# Patient Record
Sex: FEMALE | Race: WHITE
[De-identification: names, ages, dates, MRNs, and addresses within clinical notes are randomized per-mention and may not be internally consistent; named-entity substitution may affect disease eponyms.]

---

## 2022-12-18 ENCOUNTER — HOSPITAL ENCOUNTER (EMERGENCY)
Dept: HOSPITAL 75 - ER | Age: 72
Discharge: HOME | End: 2022-12-18
Payer: MEDICARE

## 2022-12-18 VITALS — DIASTOLIC BLOOD PRESSURE: 64 MMHG | SYSTOLIC BLOOD PRESSURE: 129 MMHG

## 2022-12-18 VITALS — WEIGHT: 165.35 LBS | HEIGHT: 65.75 IN | BODY MASS INDEX: 26.89 KG/M2

## 2022-12-18 DIAGNOSIS — N39.0: ICD-10-CM

## 2022-12-18 DIAGNOSIS — Z20.822: ICD-10-CM

## 2022-12-18 DIAGNOSIS — R73.9: ICD-10-CM

## 2022-12-18 DIAGNOSIS — J10.89: Primary | ICD-10-CM

## 2022-12-18 LAB
ALBUMIN SERPL-MCNC: 4.3 GM/DL (ref 3.2–4.5)
ALP SERPL-CCNC: 76 U/L (ref 40–136)
ALT SERPL-CCNC: 24 U/L (ref 0–55)
APTT PPP: YELLOW S
BACTERIA #/AREA URNS HPF: (no result) /HPF
BASOPHILS # BLD AUTO: 0 10^3/UL (ref 0–0.1)
BASOPHILS NFR BLD AUTO: 0 % (ref 0–10)
BILIRUB SERPL-MCNC: 0.5 MG/DL (ref 0.1–1)
BILIRUB UR QL STRIP: NEGATIVE
BUN/CREAT SERPL: 14
CALCIUM SERPL-MCNC: 9 MG/DL (ref 8.5–10.1)
CHLORIDE SERPL-SCNC: 102 MMOL/L (ref 98–107)
CO2 SERPL-SCNC: 22 MMOL/L (ref 21–32)
CREAT SERPL-MCNC: 0.95 MG/DL (ref 0.6–1.3)
EOSINOPHIL # BLD AUTO: 0 10^3/UL (ref 0–0.3)
EOSINOPHIL NFR BLD AUTO: 0 % (ref 0–10)
FIBRINOGEN PPP-MCNC: CLEAR MG/DL
GFR SERPLBLD BASED ON 1.73 SQ M-ARVRAT: 64 ML/MIN
GLUCOSE SERPL-MCNC: 186 MG/DL (ref 70–105)
GLUCOSE UR STRIP-MCNC: NEGATIVE MG/DL
HCT VFR BLD CALC: 46 % (ref 35–52)
HGB BLD-MCNC: 15.5 G/DL (ref 11.5–16)
HYALINE CASTS #/AREA URNS LPF: (no result) /LPF
KETONES UR QL STRIP: (no result)
LEUKOCYTE ESTERASE UR QL STRIP: NEGATIVE
LYMPHOCYTES # BLD AUTO: 0.8 10^3/UL (ref 1–4)
LYMPHOCYTES NFR BLD AUTO: 7 % (ref 12–44)
MANUAL DIFFERENTIAL PERFORMED BLD QL: NO
MCH RBC QN AUTO: 30 PG (ref 25–34)
MCHC RBC AUTO-ENTMCNC: 34 G/DL (ref 32–36)
MCV RBC AUTO: 87 FL (ref 80–99)
MONOCYTES # BLD AUTO: 0.7 10^3/UL (ref 0–1)
MONOCYTES NFR BLD AUTO: 7 % (ref 0–12)
NEUTROPHILS # BLD AUTO: 9 10^3/UL (ref 1.8–7.8)
NEUTROPHILS NFR BLD AUTO: 85 % (ref 42–75)
NITRITE UR QL STRIP: NEGATIVE
PH UR STRIP: 5.5 [PH] (ref 5–9)
PLATELET # BLD: 221 10^3/UL (ref 130–400)
PMV BLD AUTO: 10 FL (ref 9–12.2)
POTASSIUM SERPL-SCNC: 3.3 MMOL/L (ref 3.6–5)
PROT SERPL-MCNC: 7.5 GM/DL (ref 6.4–8.2)
PROT UR QL STRIP: (no result)
RBC #/AREA URNS HPF: (no result) /HPF
SODIUM SERPL-SCNC: 136 MMOL/L (ref 135–145)
SP GR UR STRIP: >=1.03 (ref 1.02–1.02)
SQUAMOUS #/AREA URNS HPF: (no result) /HPF
WBC # BLD AUTO: 10.6 10^3/UL (ref 4.3–11)
WBC #/AREA URNS HPF: (no result) /HPF

## 2022-12-18 PROCEDURE — 87636 SARSCOV2 & INF A&B AMP PRB: CPT

## 2022-12-18 PROCEDURE — 81000 URINALYSIS NONAUTO W/SCOPE: CPT

## 2022-12-18 PROCEDURE — 80053 COMPREHEN METABOLIC PANEL: CPT

## 2022-12-18 PROCEDURE — 85025 COMPLETE CBC W/AUTO DIFF WBC: CPT

## 2022-12-18 PROCEDURE — 87088 URINE BACTERIA CULTURE: CPT

## 2022-12-18 PROCEDURE — 93041 RHYTHM ECG TRACING: CPT

## 2022-12-18 PROCEDURE — 36415 COLL VENOUS BLD VENIPUNCTURE: CPT

## 2022-12-18 PROCEDURE — 71045 X-RAY EXAM CHEST 1 VIEW: CPT

## 2022-12-18 NOTE — ED COUGH/URI
General


Chief Complaint:  Cough/Cold/Flu Symptoms


Stated Complaint:  INFLUENZA A+/BODYACHES/WEAKNESS/CONFUSED/SOA


Nursing Triage Note:  


Patient was seen at [facility] today with complaints of cough, congestion and 


fever. Pt. was diagnosed with Flu A. Temp is currently 104.5


Source:  patient (LIMITED HISTORIAN), family (SON-LIMITED HISTORIAN)





History of Present Illness


Date Seen by Provider:  Dec 18, 2022


Time Seen by Provider:  20:00


Initial Comments


PT ARRIVES VIA POV FROM HOME, WITH SON


PT BEGAN GETTING SICK YESTERDAY WITH A SCRATCHY THROAT AND COUGH/CONGESTION


SYMPTOMS WORSE TODAY


SHE DROVE HERSELF TO [facility] --DOES NOT KNOW WHICH ONE SHE WENT TO--SHE THINKS 

"[place]" ( THERE IS NOT A [facility] IN [place])


SHE STATES SHE TESTED + FOR THE FLU, DOES NOT KNOW IF SHE WAS PRESCRIBED ANY 

MEDICATIONS OR NOT, BUT SHE HAS NOT TAKEN ANYTHING FOR ANY OF HER SYMPTOMS AT 

ANY TIME


SHE HAS NOT CHECKED HER TEMP AT HOME AND WAS UNAWARE OF FEVER--TEMP .5 ON 

ARRIVAL HERE


PT HAS HAD BODY ACHES, HEADACHE, GENERALIZED WEAKNESS, FEELING SHORT OF BREATH, 

AND FAMILY STATES SHE HAS BEEN DISORIENTED TONIGHT





PT DENIES ANY GI SYMPTOMS, AND STATES SHE HAS BEEN DRINKING LIQUIDS AND VOIDING 

NORMALLY





SHE MOVED HERE A FEW MONTHS AGO FROM [place], [place]


SHE HAS BEEN TO [facility] TO ESTABLISH CARE, BUT DOES NOT KNOW WHICH ONE IT IS. 





SHE HAS HISTORY OF HTN


SHE HAD BREAST CANCER APPROXIMATELY 15 YEARS AGO. BILAT MASTECTOMY ONLY. NO 

CHEMO, NO RADIATION


SHE IS NON-SMOKER, NON-DRINKER


DENIES ANY HISTORY OF RESPIRATORY PROBLEMS OR CARDIAC PROBLEMS OR DIABETES





PCP: [facility]





Allergies and Home Medications


Allergies


Coded Allergies:  


     No Known Drug Allergies (Unverified , 12/18/22)





Review of Systems


Review of Systems


Constitutional:  see HPI, fever, malaise, weakness


EENTM:  see HPI, nose congestion, throat pain


Respiratory:  see HPI, cough, short of breath


Cardiovascular:  no symptoms reported; No chest pain


Gastrointestinal:  No abdominal pain, No diarrhea, No nausea, No vomiting


Genitourinary:  no symptoms reported; No decreased output


Musculoskeletal:  see HPI (BODY ACHES)


Skin:  no symptoms reported


Psychiatric/Neurological:  See HPI, Headache


Hematologic/Lymphatic:  No Symptoms Reported


Immunological/Allergic:  no symptoms reported





Past Medical-Social-Family Hx


Patient Social History


Tobacco Use?:  No


Smoking Status:  Never a Smoker


Smokeless Tobacco Frequency:  Never a User


Use of E-Cig and/or Vaping Nigel:  Never a User


Substance use?:  No


Alcohol Use?:  No





Past Medical History


Surgeries:  Yes (BILATERAL MASTECTOMY)


Breast


Respiratory:  No


Cardiac:  Yes


Hypertension


Neurological:  No


GYN History:  Menopausal


Genitourinary:  No


Gastrointestinal:  No


Musculoskeletal:  No


Endocrine:  No


HEENT:  No


Cancer:  Yes


Breast


Did You Recieve Any Treatments:  Yes


What Type of Treatment Did You:  Surgical Intervention





BILATERAL MASTECTOMY APPROXIMATELY 15 YEARS AGO


NO CHEMO OR RADIATION


Psychosocial:  No


Integumentary:  No


Blood Disorders:  No





Physical Exam





Vital Signs - First Documented








 12/18/22





 19:59


 


Temp 40.3


 


Pulse 134


 


Resp 20


 


B/P (MAP) 158/91 (113)


 


Pulse Ox 95


 


O2 Delivery Room Air





Capillary Refill : Less Than 3 Seconds


Height: '"


Weight: lbs. oz. kg; 26.00 BMI


Method:


General Appearance:  WD/WN, no apparent distress, other (LETHARGIC, TALKS BUT 

GIVES MINIMAL ANSWERS. )


HEENT:  PERRL/EOMI, other (ORAL MUCOSA DRY)


Neck:  normal inspection


Respiratory:  normal breath sounds, no respiratory distress, no accessory muscle

use


Cardiovascular:  no edema, no JVD, no murmur, tachycardia (130'S)


Gastrointestinal:  non tender, soft


Extremities:  normal inspection, no pedal edema, normal capillary refill


Neurologic/Psychiatric:  CNs II-XII nml as tested, no motor/sensory deficits, 

alert, normal mood/affect, oriented x 3 (BUT LIMITED MEMORY AT THIS TIME, ), 

other (GENERALIZED WEAKNESS, NO FOCAL DEFICITS)


Skin:  normal color, warm/dry, other (VERY WARM)





Progress/Results/Core Measures


Suspected Sepsis


SIRS


Temperature: 


Pulse: 134 


Respiratory Rate: 20


 


Laboratory Tests


12/18/22 20:10: White Blood Count 10.6


Blood Pressure 158 /91 


Mean: 113


 


Laboratory Tests


12/18/22 20:10: 


Creatinine 0.95, Platelet Count 221, Total Bilirubin 0.5








Results/Orders


Lab Results





Laboratory Tests








Test


 12/18/22


20:10 12/18/22


20:21 12/18/22


21:10 Range/Units


 


 


White Blood Count


 10.6 


 


 


 4.3-11.0


10^3/uL


 


Red Blood Count


 5.26 H


 


 


 3.80-5.11


10^6/uL


 


Hemoglobin 15.5    11.5-16.0  g/dL


 


Hematocrit 46    35-52  %


 


Mean Corpuscular Volume 87    80-99  fL


 


Mean Corpuscular Hemoglobin 30    25-34  pg


 


Mean Corpuscular Hemoglobin


Concent 34 


 


 


 32-36  g/dL





 


Red Cell Distribution Width 13.1    10.0-14.5  %


 


Platelet Count


 221 


 


 


 130-400


10^3/uL


 


Mean Platelet Volume 10.0    9.0-12.2  fL


 


Immature Granulocyte % (Auto) 0     %


 


Neutrophils (%) (Auto) 85 H   42-75  %


 


Lymphocytes (%) (Auto) 7 L   12-44  %


 


Monocytes (%) (Auto) 7    0-12  %


 


Eosinophils (%) (Auto) 0    0-10  %


 


Basophils (%) (Auto) 0    0-10  %


 


Neutrophils # (Auto)


 9.0 H


 


 


 1.8-7.8


10^3/uL


 


Lymphocytes # (Auto)


 0.8 L


 


 


 1.0-4.0


10^3/uL


 


Monocytes # (Auto)


 0.7 


 


 


 0.0-1.0


10^3/uL


 


Eosinophils # (Auto)


 0.0 


 


 


 0.0-0.3


10^3/uL


 


Basophils # (Auto)


 0.0 


 


 


 0.0-0.1


10^3/uL


 


Immature Granulocyte # (Auto)


 0.0 


 


 


 0.0-0.1


10^3/uL


 


Sodium Level 136    135-145  MMOL/L


 


Potassium Level 3.3 L   3.6-5.0  MMOL/L


 


Chloride Level 102      MMOL/L


 


Carbon Dioxide Level 22    21-32  MMOL/L


 


Anion Gap 12    5-14  MMOL/L


 


Blood Urea Nitrogen 13    7-18  MG/DL


 


Creatinine


 0.95 


 


 


 0.60-1.30


MG/DL


 


Estimat Glomerular Filtration


Rate 64 


 


 


  





 


BUN/Creatinine Ratio 14     


 


Glucose Level 186 H     MG/DL


 


Calcium Level 9.0    8.5-10.1  MG/DL


 


Corrected Calcium 8.8    8.5-10.1  MG/DL


 


Total Bilirubin 0.5    0.1-1.0  MG/DL


 


Aspartate Amino Transf


(AST/SGOT) 21 


 


 


 5-34  U/L





 


Alanine Aminotransferase


(ALT/SGPT) 24 


 


 


 0-55  U/L





 


Alkaline Phosphatase 76      U/L


 


Total Protein 7.5    6.4-8.2  GM/DL


 


Albumin 4.3    3.2-4.5  GM/DL


 


Influenza Type A (RT-PCR)  Detected H  Not Detecte  


 


Influenza Type B (RT-PCR)  Not Detected   Not Detecte  


 


SARS-CoV-2 RNA (RT-PCR)  Not Detected   Not Detecte  


 


Urine Color   YELLOW   


 


Urine Clarity   CLEAR   


 


Urine pH   5.5  5-9  


 


Urine Specific Gravity   >=1.030  1.016-1.022  


 


Urine Protein   1+ H NEGATIVE  


 


Urine Glucose (UA)   NEGATIVE  NEGATIVE  


 


Urine Ketones   2+ H NEGATIVE  


 


Urine Nitrite   NEGATIVE  NEGATIVE  


 


Urine Bilirubin   NEGATIVE  NEGATIVE  


 


Urine Urobilinogen   0.2  < = 1.0  MG/DL


 


Urine Leukocyte Esterase   NEGATIVE  NEGATIVE  


 


Urine RBC (Auto)   2+ H NEGATIVE  


 


Urine RBC   2-5 H  /HPF


 


Urine WBC   5-10 H  /HPF


 


Urine Squamous Epithelial


Cells 


 


 0-2 


  /HPF





 


Urine Crystals   NONE   /LPF


 


Urine Bacteria   FEW H  /HPF


 


Urine Casts   PRESENT   /LPF


 


Urine Hyaline Casts   5-10 H  /LPF


 


Urine Mucus   SMALL H  /LPF


 


Urine Culture Indicated   YES   








My Orders





Orders - JESI CORDOVA DO


Ed Iv/Invasive Line Start (12/18/22 20:03)


Monitor-Rhythm Ecg Trace Only (12/18/22 20:03)


Cbc With Automated Diff (12/18/22 20:03)


Comprehensive Metabolic Panel (12/18/22 20:03)


Ua Culture If Indicated (12/18/22 20:03)


Ed Iv/Invasive Line Start (12/18/22 20:03)


Lactated Ringers (Lr 1000 Ml Iv Solution (12/18/22 20:15)


Acetaminophen  Tablet (Tylenol  Tablet) (12/18/22 20:15)


Ibuprofen  Tablet (Motrin  Tablet) (12/18/22 20:15)


Chest 1 View, Ap/Pa Only (12/18/22 20:03)


Covid 19 Inhouse Test (12/18/22 20:08)


Influenza A And B By Pcr (12/18/22 20:08)


Isolation Central Supply Req (12/18/22 20:08)


Urine Culture (12/18/22 21:10)





Medications Given in ED





Current Medications








 Medications  Dose


 Ordered  Sig/Saul


 Route  Start Time


 Stop Time Status Last Admin


Dose Admin


 


 Acetaminophen  1,000 mg  ONCE  ONCE


 PO  12/18/22 20:15


 12/18/22 20:16 DC 12/18/22 20:11


1,000 MG


 


 Ibuprofen  800 mg  ONCE  ONCE


 PO  12/18/22 20:15


 12/18/22 20:16 DC 12/18/22 20:11


800 MG


 


 Lactated Ringer's  1,000 ml @ 


 0 mls/hr  Q0M ONCE


 IV  12/18/22 20:15


 12/18/22 20:16 DC 12/18/22 20:11


0 MLS/HR








Vital Signs/I&O











 12/18/22 12/18/22 12/18/22 12/18/22





 19:59 20:11 20:11 21:17


 


Temp 40.3 40.3 40.3 38.2


 


Pulse 134   


 


Resp 20   


 


B/P (MAP) 158/91 (113)   


 


Pulse Ox 95   


 


O2 Delivery Room Air   





Capillary Refill : Less Than 3 Seconds








Blood Pressure Mean:                    113








Progress Note :  


Progress Note





GIVEN:


-IV FLUIDS


-TYLENOL AND MOTRIN





RARE COUGH


NO DYSPNEA


NO HYPOXIA--O2 SATS 95-96% ON ROOM AIR





Diagnostic Imaging





Comments





CXR--PER RADIOLOGIST REPORT AT 2104





Findings: Bibasilar ill-defined airspace opacities are greater on


the right. No pleural effusion or pneumothorax. Normal heart


size.





Impression: Bilateral pulmonary opacities may be due to


pneumonitis associated with viral infection.


   Reviewed:  Reviewed by Me





Departure


Impression





   Primary Impression:  


   Influenza A


   Additional Impressions:  


   UTI (urinary tract infection)


   Hyperglycemia


Disposition:  01 HOME, SELF-CARE


Condition:  Improved





Departure-Patient Inst.


Decision time for Depature:  21:34


Referrals:  


[facility] (PCP/Family)


Primary Care Physician


Patient Instructions:  Flu, Adult ED, High Blood Sugar, Adult ED, Preventing the

Spread of an Infectious Disease, Urinary Tract Infection, Adult ED





Add. Discharge Instructions:  


LOTS OF CLEAR LIQUIDS--WATER, BROTH, JELLO, GATORADE


DRINK ENOUGH SO YOU ARE URINATING EVERY 2-3 HOURS WHILE AWAKE





CHECK YOUR TEMPERATURE EVERY 2-3 HOURS AND ALTERNATE TYLENOL AND MOTRIN EVERY 2-

3 HOURS FOR PAIN OR FEVER OVER 100





TAKE YOUR TAMIFLU TWICE A DAY EVERY DAY





FOLLOW UP WITH [facility] IN 3-4 DAYS IF NO BETTER, RETURN TO ER IF SYMPTOMS WORSEN





All discharge instructions reviewed with patient and/or family. Voiced 

understanding.


Scripts


Benzonatate (TESSALON PERLES) 100 Mg Capsule


200 MG PO TID, #50 CAP


   Prov: JESI CORDOVA DO         12/18/22 


Guaifenesin/Dextromethorphan (Mucinex Dm ER 1,200-60 mg Tab) 1,200 Mg-60 Mg 

Tbmp.12hr


1 EACH PO BID, #20 EA


   Prov: JESI CORDOVA DO         12/18/22 


Nitrofurantoin Monohyd/M-Cryst (Macrobid 100 mg Capsule) 100 Mg Capsule


1 TAB PO BID, #20 CAP


   Prov: JESI CORDOVA DO         12/18/22











JESI CORDOVA DO                 Dec 18, 2022 20:32

## 2022-12-18 NOTE — DIAGNOSTIC IMAGING REPORT
Chest 1 view, AP/PA only



Indication: Fever, cough.



Comparison: None available. 



Findings: Bibasilar ill-defined airspace opacities are greater on

the right. No pleural effusion or pneumothorax. Normal heart

size.



Impression: Bilateral pulmonary opacities may be due to

pneumonitis associated with viral infection. 



Dictated by: 



  Dictated on workstation # [id number]